# Patient Record
Sex: FEMALE | Race: WHITE | NOT HISPANIC OR LATINO | Employment: OTHER | URBAN - METROPOLITAN AREA
[De-identification: names, ages, dates, MRNs, and addresses within clinical notes are randomized per-mention and may not be internally consistent; named-entity substitution may affect disease eponyms.]

---

## 2022-07-18 ENCOUNTER — HOSPITAL ENCOUNTER (EMERGENCY)
Age: 87
Discharge: HOME | End: 2022-07-18
Attending: EMERGENCY MEDICINE

## 2022-07-18 VITALS
TEMPERATURE: 97.9 F | HEART RATE: 74 BPM | RESPIRATION RATE: 15 BRPM | DIASTOLIC BLOOD PRESSURE: 69 MMHG | WEIGHT: 206.57 LBS | SYSTOLIC BLOOD PRESSURE: 133 MMHG | OXYGEN SATURATION: 99 %

## 2022-07-18 DIAGNOSIS — H53.8 BLURRY VISION: Primary | ICD-10-CM

## 2022-07-18 PROCEDURE — 99283 EMERGENCY DEPT VISIT LOW MDM: CPT

## 2022-07-18 RX ORDER — CLONAZEPAM 0.5 MG/1
TABLET ORAL
COMMUNITY
Start: 2022-06-28

## 2022-07-18 RX ORDER — HYDROXYZINE PAMOATE 25 MG/1
CAPSULE ORAL
COMMUNITY
Start: 2022-04-29

## 2022-07-18 RX ORDER — CLONAZEPAM 0.25 MG/1
TABLET, ORALLY DISINTEGRATING ORAL
COMMUNITY
Start: 2022-07-11

## 2022-07-18 RX ORDER — APIXABAN 2.5 MG/1
TABLET, FILM COATED ORAL
COMMUNITY
Start: 2022-06-11

## 2022-07-18 RX ORDER — SERTRALINE HYDROCHLORIDE 25 MG/1
TABLET, FILM COATED ORAL
COMMUNITY
Start: 2022-04-13

## 2022-07-18 ASSESSMENT — ENCOUNTER SYMPTOMS
SEIZURES: 0
HEADACHES: 0
TREMORS: 0
DIZZINESS: 0
NUMBNESS: 0
SPEECH DIFFICULTY: 0
LIGHT-HEADEDNESS: 0
BRUISES/BLEEDS EASILY: 1
FACIAL ASYMMETRY: 0
WEAKNESS: 0

## 2022-07-18 ASSESSMENT — PAIN SCALES - GENERAL: PAINLEVEL_OUTOF10: 0

## 2022-07-19 LAB
RAINBOW EXTRA TUBES HOLD SPECIMEN: NORMAL

## 2022-10-05 ENCOUNTER — APPOINTMENT (OUTPATIENT)
Dept: CV DIAGNOSTICS | Facility: HOSPITAL | Age: 87
End: 2022-10-05
Attending: INTERNAL MEDICINE
Payer: MEDICARE

## 2022-10-05 ENCOUNTER — APPOINTMENT (OUTPATIENT)
Dept: GENERAL RADIOLOGY | Facility: HOSPITAL | Age: 87
End: 2022-10-05
Attending: HOSPITALIST
Payer: MEDICARE

## 2022-10-05 ENCOUNTER — APPOINTMENT (OUTPATIENT)
Dept: CT IMAGING | Facility: HOSPITAL | Age: 87
End: 2022-10-05
Attending: EMERGENCY MEDICINE
Payer: MEDICARE

## 2022-10-05 ENCOUNTER — HOSPITAL ENCOUNTER (OUTPATIENT)
Facility: HOSPITAL | Age: 87
Setting detail: OBSERVATION
Discharge: NURSING, REHAB OR LONG-TERM CARE | End: 2022-10-06
Attending: EMERGENCY MEDICINE | Admitting: STUDENT IN AN ORGANIZED HEALTH CARE EDUCATION/TRAINING PROGRAM
Payer: MEDICARE

## 2022-10-05 ENCOUNTER — HOSPITAL ENCOUNTER (INPATIENT)
Facility: HOSPITAL | Age: 87
LOS: 1 days | Discharge: NURSING, REHAB OR LONG-TERM CARE | End: 2022-10-06
Attending: EMERGENCY MEDICINE | Admitting: STUDENT IN AN ORGANIZED HEALTH CARE EDUCATION/TRAINING PROGRAM
Payer: MEDICARE

## 2022-10-05 DIAGNOSIS — I48.91 ATRIAL FIBRILLATION, UNSPECIFIED TYPE (HCC): ICD-10-CM

## 2022-10-05 DIAGNOSIS — I10 HYPERTENSION, UNSPECIFIED TYPE: ICD-10-CM

## 2022-10-05 DIAGNOSIS — K43.6 INCARCERATED VENTRAL HERNIA: Primary | ICD-10-CM

## 2022-10-05 DIAGNOSIS — F01.50 VASCULAR DEMENTIA, UNSPECIFIED DEMENTIA SEVERITY, UNSPECIFIED WHETHER BEHAVIORAL, PSYCHOTIC, OR MOOD DISTURBANCE OR ANXIETY (HCC): ICD-10-CM

## 2022-10-05 DIAGNOSIS — K43.9 VENTRAL HERNIA WITHOUT OBSTRUCTION OR GANGRENE: Primary | ICD-10-CM

## 2022-10-05 LAB
ALBUMIN SERPL-MCNC: 3.2 G/DL (ref 3.4–5)
ALBUMIN/GLOB SERPL: 0.9 {RATIO} (ref 1–2)
ALP LIVER SERPL-CCNC: 114 U/L
ALT SERPL-CCNC: 11 U/L
ANION GAP SERPL CALC-SCNC: 5 MMOL/L (ref 0–18)
AST SERPL-CCNC: 13 U/L (ref 15–37)
BASOPHILS # BLD AUTO: 0.03 X10(3) UL (ref 0–0.2)
BASOPHILS NFR BLD AUTO: 0.3 %
BILIRUB SERPL-MCNC: 0.5 MG/DL (ref 0.1–2)
BILIRUB UR QL STRIP.AUTO: NEGATIVE
BUN BLD-MCNC: 14 MG/DL (ref 7–18)
CALCIUM BLD-MCNC: 9.7 MG/DL (ref 8.5–10.1)
CHLORIDE SERPL-SCNC: 107 MMOL/L (ref 98–112)
CLARITY UR REFRACT.AUTO: CLEAR
CO2 SERPL-SCNC: 26 MMOL/L (ref 21–32)
CREAT BLD-MCNC: 0.62 MG/DL
EOSINOPHIL # BLD AUTO: 0.35 X10(3) UL (ref 0–0.7)
EOSINOPHIL NFR BLD AUTO: 3.7 %
ERYTHROCYTE [DISTWIDTH] IN BLOOD BY AUTOMATED COUNT: 14.1 %
GFR SERPLBLD BASED ON 1.73 SQ M-ARVRAT: 85 ML/MIN/1.73M2 (ref 60–?)
GLOBULIN PLAS-MCNC: 3.6 G/DL (ref 2.8–4.4)
GLUCOSE BLD-MCNC: 102 MG/DL (ref 70–99)
GLUCOSE UR STRIP.AUTO-MCNC: NEGATIVE MG/DL
HCT VFR BLD AUTO: 41.2 %
HGB BLD-MCNC: 13.3 G/DL
IMM GRANULOCYTES # BLD AUTO: 0.03 X10(3) UL (ref 0–1)
IMM GRANULOCYTES NFR BLD: 0.3 %
KETONES UR STRIP.AUTO-MCNC: NEGATIVE MG/DL
LEUKOCYTE ESTERASE UR QL STRIP.AUTO: NEGATIVE
LYMPHOCYTES # BLD AUTO: 1.28 X10(3) UL (ref 1–4)
LYMPHOCYTES NFR BLD AUTO: 13.5 %
MCH RBC QN AUTO: 29.7 PG (ref 26–34)
MCHC RBC AUTO-ENTMCNC: 32.3 G/DL (ref 31–37)
MCV RBC AUTO: 92 FL
MONOCYTES # BLD AUTO: 0.74 X10(3) UL (ref 0.1–1)
MONOCYTES NFR BLD AUTO: 7.8 %
NEUTROPHILS # BLD AUTO: 7.07 X10 (3) UL (ref 1.5–7.7)
NEUTROPHILS # BLD AUTO: 7.07 X10(3) UL (ref 1.5–7.7)
NEUTROPHILS NFR BLD AUTO: 74.4 %
NITRITE UR QL STRIP.AUTO: NEGATIVE
NT-PROBNP SERPL-MCNC: 1174 PG/ML (ref ?–450)
OSMOLALITY SERPL CALC.SUM OF ELEC: 287 MOSM/KG (ref 275–295)
PH UR STRIP.AUTO: 8 [PH] (ref 5–8)
PLATELET # BLD AUTO: 239 10(3)UL (ref 150–450)
POTASSIUM SERPL-SCNC: 4.1 MMOL/L (ref 3.5–5.1)
PROT SERPL-MCNC: 6.8 G/DL (ref 6.4–8.2)
PROT UR STRIP.AUTO-MCNC: NEGATIVE MG/DL
RBC # BLD AUTO: 4.48 X10(6)UL
RBC UR QL AUTO: NEGATIVE
SARS-COV-2 RNA RESP QL NAA+PROBE: NOT DETECTED
SODIUM SERPL-SCNC: 138 MMOL/L (ref 136–145)
SP GR UR STRIP.AUTO: 1 (ref 1–1.03)
UROBILINOGEN UR STRIP.AUTO-MCNC: <2 MG/DL
WBC # BLD AUTO: 9.5 X10(3) UL (ref 4–11)

## 2022-10-05 PROCEDURE — 99222 1ST HOSP IP/OBS MODERATE 55: CPT | Performed by: STUDENT IN AN ORGANIZED HEALTH CARE EDUCATION/TRAINING PROGRAM

## 2022-10-05 PROCEDURE — 99223 1ST HOSP IP/OBS HIGH 75: CPT | Performed by: HOSPITALIST

## 2022-10-05 PROCEDURE — 71045 X-RAY EXAM CHEST 1 VIEW: CPT | Performed by: HOSPITALIST

## 2022-10-05 PROCEDURE — 93306 TTE W/DOPPLER COMPLETE: CPT | Performed by: INTERNAL MEDICINE

## 2022-10-05 PROCEDURE — 74177 CT ABD & PELVIS W/CONTRAST: CPT | Performed by: EMERGENCY MEDICINE

## 2022-10-05 RX ORDER — AMLODIPINE BESYLATE 5 MG/1
10 TABLET ORAL DAILY
Status: DISCONTINUED | OUTPATIENT
Start: 2022-10-06 | End: 2022-10-06

## 2022-10-05 RX ORDER — MORPHINE SULFATE 4 MG/ML
1 INJECTION, SOLUTION INTRAMUSCULAR; INTRAVENOUS EVERY 2 HOUR PRN
Status: DISCONTINUED | OUTPATIENT
Start: 2022-10-05 | End: 2022-10-06

## 2022-10-05 RX ORDER — ENOXAPARIN SODIUM 100 MG/ML
40 INJECTION SUBCUTANEOUS DAILY
Status: DISCONTINUED | OUTPATIENT
Start: 2022-10-05 | End: 2022-10-06

## 2022-10-05 RX ORDER — MORPHINE SULFATE 4 MG/ML
2 INJECTION, SOLUTION INTRAMUSCULAR; INTRAVENOUS EVERY 2 HOUR PRN
Status: DISCONTINUED | OUTPATIENT
Start: 2022-10-05 | End: 2022-10-06

## 2022-10-05 RX ORDER — METOCLOPRAMIDE HYDROCHLORIDE 5 MG/ML
10 INJECTION INTRAMUSCULAR; INTRAVENOUS EVERY 8 HOURS PRN
Status: DISCONTINUED | OUTPATIENT
Start: 2022-10-05 | End: 2022-10-06

## 2022-10-05 RX ORDER — NYSTATIN 10B UNIT
POWDER (EA) MISCELLANEOUS 3 TIMES DAILY
COMMUNITY

## 2022-10-05 RX ORDER — AMOXICILLIN 500 MG
1200 CAPSULE ORAL DAILY
COMMUNITY

## 2022-10-05 RX ORDER — SODIUM CHLORIDE 9 MG/ML
1000 INJECTION, SOLUTION INTRAVENOUS ONCE
Status: COMPLETED | OUTPATIENT
Start: 2022-10-05 | End: 2022-10-05

## 2022-10-05 RX ORDER — CLONAZEPAM 0.25 MG/1
0.25 TABLET, ORALLY DISINTEGRATING ORAL DAILY
Status: DISCONTINUED | OUTPATIENT
Start: 2022-10-05 | End: 2022-10-06

## 2022-10-05 RX ORDER — ONDANSETRON 2 MG/ML
4 INJECTION INTRAMUSCULAR; INTRAVENOUS ONCE
Status: COMPLETED | OUTPATIENT
Start: 2022-10-05 | End: 2022-10-05

## 2022-10-05 RX ORDER — ACETAMINOPHEN AND CODEINE PHOSPHATE 300; 30 MG/1; MG/1
1 TABLET ORAL EVERY 8 HOURS PRN
COMMUNITY
Start: 2022-10-01 | End: 2022-10-06

## 2022-10-05 RX ORDER — MELATONIN
6 NIGHTLY PRN
COMMUNITY

## 2022-10-05 RX ORDER — ACETAMINOPHEN 325 MG/1
650 TABLET ORAL EVERY 6 HOURS PRN
Status: DISCONTINUED | OUTPATIENT
Start: 2022-10-05 | End: 2022-10-06

## 2022-10-05 RX ORDER — NITROGLYCERIN 0.4 MG/1
0.4 TABLET SUBLINGUAL EVERY 5 MIN PRN
COMMUNITY

## 2022-10-05 RX ORDER — ACETAMINOPHEN 325 MG/1
650 TABLET ORAL EVERY 6 HOURS PRN
COMMUNITY

## 2022-10-05 RX ORDER — ATORVASTATIN CALCIUM 10 MG/1
10 TABLET, FILM COATED ORAL NIGHTLY
Status: DISCONTINUED | OUTPATIENT
Start: 2022-10-05 | End: 2022-10-06

## 2022-10-05 RX ORDER — PANTOPRAZOLE SODIUM 40 MG/1
40 TABLET, DELAYED RELEASE ORAL
Status: DISCONTINUED | OUTPATIENT
Start: 2022-10-06 | End: 2022-10-06

## 2022-10-05 RX ORDER — AMLODIPINE BESYLATE 10 MG/1
10 TABLET ORAL DAILY
COMMUNITY

## 2022-10-05 RX ORDER — DESMOPRESSIN ACETATE 0.1 MG/1
0.2 TABLET ORAL DAILY
Status: DISCONTINUED | OUTPATIENT
Start: 2022-10-05 | End: 2022-10-06

## 2022-10-05 RX ORDER — FUROSEMIDE 10 MG/ML
20 INJECTION INTRAMUSCULAR; INTRAVENOUS ONCE
Status: COMPLETED | OUTPATIENT
Start: 2022-10-05 | End: 2022-10-05

## 2022-10-05 RX ORDER — ACETAMINOPHEN 500 MG
500 TABLET ORAL 2 TIMES DAILY
COMMUNITY
End: 2022-10-06

## 2022-10-05 RX ORDER — MELATONIN
6 NIGHTLY PRN
Status: DISCONTINUED | OUTPATIENT
Start: 2022-10-05 | End: 2022-10-06

## 2022-10-05 RX ORDER — MORPHINE SULFATE 4 MG/ML
4 INJECTION, SOLUTION INTRAMUSCULAR; INTRAVENOUS EVERY 2 HOUR PRN
Status: DISCONTINUED | OUTPATIENT
Start: 2022-10-05 | End: 2022-10-06

## 2022-10-05 RX ORDER — ONDANSETRON 2 MG/ML
4 INJECTION INTRAMUSCULAR; INTRAVENOUS EVERY 6 HOURS PRN
Status: DISCONTINUED | OUTPATIENT
Start: 2022-10-05 | End: 2022-10-06

## 2022-10-05 RX ORDER — SODIUM CHLORIDE 9 MG/ML
INJECTION, SOLUTION INTRAVENOUS CONTINUOUS
Status: DISCONTINUED | OUTPATIENT
Start: 2022-10-05 | End: 2022-10-05

## 2022-10-05 RX ORDER — ATORVASTATIN CALCIUM 10 MG/1
10 TABLET, FILM COATED ORAL NIGHTLY
COMMUNITY

## 2022-10-05 RX ORDER — HYDROMORPHONE HYDROCHLORIDE 1 MG/ML
0.5 INJECTION, SOLUTION INTRAMUSCULAR; INTRAVENOUS; SUBCUTANEOUS EVERY 30 MIN PRN
Status: DISCONTINUED | OUTPATIENT
Start: 2022-10-05 | End: 2022-10-05

## 2022-10-05 RX ORDER — ONDANSETRON 2 MG/ML
4 INJECTION INTRAMUSCULAR; INTRAVENOUS EVERY 4 HOURS PRN
Status: DISCONTINUED | OUTPATIENT
Start: 2022-10-05 | End: 2022-10-05

## 2022-10-05 RX ORDER — PANTOPRAZOLE SODIUM 40 MG/1
40 TABLET, DELAYED RELEASE ORAL
COMMUNITY

## 2022-10-05 RX ORDER — CLONAZEPAM 0.25 MG/1
0.25 TABLET, ORALLY DISINTEGRATING ORAL DAILY
Status: ON HOLD | COMMUNITY
Start: 2022-09-30 | End: 2022-10-06

## 2022-10-05 RX ORDER — DESMOPRESSIN ACETATE 0.2 MG/1
0.2 TABLET ORAL DAILY
COMMUNITY

## 2022-10-05 NOTE — ED QUICK NOTES
Pt awake and alert. No pain. Awaiting bed assignment.  Aware of her admission and verbalizing understanding

## 2022-10-05 NOTE — ED INITIAL ASSESSMENT (HPI)
A/O x 3-4. Patient from [address] [place] [address]. Patient presents with abdominal pain and \"abdominal mass\". Patient reports that abdominal pain started 2-3 hours ago.   Denies any nausea, vomiting, diarrhea

## 2022-10-05 NOTE — PROGRESS NOTES
Full note to follow:    Is a 80-year-old female with history of A. fib on Eliquis who presents with abdominal pain. She reports having bowel movements most recently yesterday. She denies any nausea or vomiting and has no obstructive symptoms. She reports that this hernia that she is has since 2008 since her gallbladder surgery. She reports decreased pain at this time. Abdomen is soft, nondistended, nontender, no rebound tenderness or guarding no peritoneal signs, there is a hernia defect in the periumbilical area with herniated: That is chronically incarcerated and nontender patient    She has no leukocytosis reviewed her CT scan there is an evidence of an incarcerated ventral hernia with part of the transverse colon with some fluid around it      Plan for now is to hold her Eliquis and get cardiac clearance in case this becomes an operative issue. However due to the chronicity of this hernia is unlikely to be strangulated and the changes in the hernia sac are likely due to ER attempts of reduction. She has minimal pain at this time with no tenderness at the hernia and has no other indices of an acute abdomen.         Lukasz Hurd MD  [facility] general surgery

## 2022-10-05 NOTE — PLAN OF CARE
Assumed care of patient at ~1200 from ED. Alert and oriented. Forgetful. Atrial fib on tele. Heart rate controlled. Lungs with crackles at bases. SPO2 high 80s on room air. O2 weaned from 3L to 1L. Breathing comfortably. Echo at bedside. Plan of care updated with son over phone and at bedside. POLST signed by son and RN. Awaiting signature from doc. Patient turns well in bed. Skin with redness to uday heels and sacrum. All blanchable. Cardiology consulted and notified. Orders rec'd. Neuro Psych consulted, face sheet faxed to Dr. Mirian Talbert office. Confirmation rec'd. Will cn to monitor .   Problem: Patient/Family Goals  Goal: Patient/Family Long Term Goal  Description: Patient's Long Term Goal: No further admission    Interventions:  - compliance with dietary modifications  -compliance with lifestyle modifications  -appropriate follow up with MD  - See additional Care Plan goals for specific interventions  Outcome: Progressing  Goal: Patient/Family Short Term Goal  Description: Patient's Short Term Goal: discharge    Interventions:   - compliance with med regimen  -participation in plan of care  -diagnostic testing  - See additional Care Plan goals for specific interventions  Outcome: Progressing

## 2022-10-06 VITALS
DIASTOLIC BLOOD PRESSURE: 60 MMHG | WEIGHT: 212.75 LBS | OXYGEN SATURATION: 99 % | HEIGHT: 67 IN | BODY MASS INDEX: 33.39 KG/M2 | RESPIRATION RATE: 16 BRPM | SYSTOLIC BLOOD PRESSURE: 114 MMHG | HEART RATE: 60 BPM | TEMPERATURE: 98 F

## 2022-10-06 LAB
ALBUMIN SERPL-MCNC: 2.7 G/DL (ref 3.4–5)
ALBUMIN/GLOB SERPL: 0.8 {RATIO} (ref 1–2)
ALP LIVER SERPL-CCNC: 109 U/L
ALT SERPL-CCNC: 8 U/L
ANION GAP SERPL CALC-SCNC: 6 MMOL/L (ref 0–18)
AST SERPL-CCNC: 12 U/L (ref 15–37)
BASOPHILS # BLD AUTO: 0.06 X10(3) UL (ref 0–0.2)
BASOPHILS NFR BLD AUTO: 0.9 %
BILIRUB SERPL-MCNC: 0.5 MG/DL (ref 0.1–2)
BUN BLD-MCNC: 11 MG/DL (ref 7–18)
CALCIUM BLD-MCNC: 9.1 MG/DL (ref 8.5–10.1)
CHLORIDE SERPL-SCNC: 108 MMOL/L (ref 98–112)
CO2 SERPL-SCNC: 23 MMOL/L (ref 21–32)
CREAT BLD-MCNC: 0.33 MG/DL
EOSINOPHIL # BLD AUTO: 0.36 X10(3) UL (ref 0–0.7)
EOSINOPHIL NFR BLD AUTO: 5.1 %
ERYTHROCYTE [DISTWIDTH] IN BLOOD BY AUTOMATED COUNT: 14.4 %
GFR SERPLBLD BASED ON 1.73 SQ M-ARVRAT: 99 ML/MIN/1.73M2 (ref 60–?)
GLOBULIN PLAS-MCNC: 3.4 G/DL (ref 2.8–4.4)
GLUCOSE BLD-MCNC: 90 MG/DL (ref 70–99)
HCT VFR BLD AUTO: 41.4 %
HGB BLD-MCNC: 12.3 G/DL
IMM GRANULOCYTES # BLD AUTO: 0.03 X10(3) UL (ref 0–1)
IMM GRANULOCYTES NFR BLD: 0.4 %
LYMPHOCYTES # BLD AUTO: 1.11 X10(3) UL (ref 1–4)
LYMPHOCYTES NFR BLD AUTO: 15.8 %
MCH RBC QN AUTO: 29.6 PG (ref 26–34)
MCHC RBC AUTO-ENTMCNC: 29.7 G/DL (ref 31–37)
MCV RBC AUTO: 99.8 FL
MONOCYTES # BLD AUTO: 0.71 X10(3) UL (ref 0.1–1)
MONOCYTES NFR BLD AUTO: 10.1 %
NEUTROPHILS # BLD AUTO: 4.77 X10 (3) UL (ref 1.5–7.7)
NEUTROPHILS # BLD AUTO: 4.77 X10(3) UL (ref 1.5–7.7)
NEUTROPHILS NFR BLD AUTO: 67.7 %
OSMOLALITY SERPL CALC.SUM OF ELEC: 283 MOSM/KG (ref 275–295)
PLATELET # BLD AUTO: 213 10(3)UL (ref 150–450)
POTASSIUM SERPL-SCNC: 4.5 MMOL/L (ref 3.5–5.1)
PROT SERPL-MCNC: 6.1 G/DL (ref 6.4–8.2)
RBC # BLD AUTO: 4.15 X10(6)UL
SODIUM SERPL-SCNC: 137 MMOL/L (ref 136–145)
WBC # BLD AUTO: 7 X10(3) UL (ref 4–11)

## 2022-10-06 PROCEDURE — 99232 SBSQ HOSP IP/OBS MODERATE 35: CPT | Performed by: STUDENT IN AN ORGANIZED HEALTH CARE EDUCATION/TRAINING PROGRAM

## 2022-10-06 PROCEDURE — 99239 HOSP IP/OBS DSCHRG MGMT >30: CPT | Performed by: HOSPITALIST

## 2022-10-06 RX ORDER — CLONAZEPAM 0.25 MG/1
0.25 TABLET, ORALLY DISINTEGRATING ORAL DAILY
Qty: 5 TABLET | Refills: 0 | Status: SHIPPED | OUTPATIENT
Start: 2022-10-06

## 2022-10-06 RX ORDER — POLYETHYLENE GLYCOL 3350 17 G/17G
17 POWDER, FOR SOLUTION ORAL DAILY PRN
Status: DISCONTINUED | OUTPATIENT
Start: 2022-10-06 | End: 2022-10-06

## 2022-10-06 RX ORDER — BISACODYL 10 MG
10 SUPPOSITORY, RECTAL RECTAL
Status: DISCONTINUED | OUTPATIENT
Start: 2022-10-06 | End: 2022-10-06

## 2022-10-06 RX ORDER — SODIUM PHOSPHATE, DIBASIC AND SODIUM PHOSPHATE, MONOBASIC 7; 19 G/133ML; G/133ML
1 ENEMA RECTAL ONCE AS NEEDED
Status: DISCONTINUED | OUTPATIENT
Start: 2022-10-06 | End: 2022-10-06

## 2022-10-06 RX ORDER — HALOPERIDOL 5 MG/ML
1 INJECTION INTRAMUSCULAR ONCE
Status: DISCONTINUED | OUTPATIENT
Start: 2022-10-06 | End: 2022-10-06

## 2022-10-06 RX ORDER — DOCUSATE SODIUM 100 MG/1
100 CAPSULE, LIQUID FILLED ORAL 2 TIMES DAILY
Status: DISCONTINUED | OUTPATIENT
Start: 2022-10-06 | End: 2022-10-06

## 2022-10-06 NOTE — PLAN OF CARE
NURSING DISCHARGE NOTE    Discharged Nursing home via Ambulance. Accompanied by Support staff  Belongings Taken by patient/family. IV and tele removed. Son Dorie Omalley updated on POC, states agrees to not have surgery at this time. Pt tolerated diet. No nausea, vomiting or pain.

## 2022-10-06 NOTE — CM/SW NOTE
10/06/22 1500   Discharge disposition   Expected discharge disposition [id number] Provider [facility]'   Discharge transportation [facility]     CM notified of pt's medical clearance for discharge, as she is declining surgical intervention. Per RN, pt denies symptoms from hernia and is wanting to go home. Grzegorz Hutchins ([phone]) at [facility]. Pat's confirmed ability for pt to return this evening. [facility] arranged for 1600 ([id number]). RN updated and requested to send updated, validated POLST with pt at discharge. PCS updated. Voicemail left for pt's son, George Hunter ([phone]), to provide update.     RN to call report: [phone] and ask for Amanda Thompson, BSN, RN-BC    [id number]

## 2022-10-06 NOTE — PROGRESS NOTES
10/06/22 1108   Clinical Encounter Type   Visited With Patient not available  (Patient was sleeping.  will return later today)       Patient was sleeping.  will return later today.       [facility] [place]   Ext [phone]

## 2022-10-06 NOTE — PROGRESS NOTES
10/06/22 1124   Clinical Encounter Type   Visited With Health care provider;Patient   Routine Visit   (Responded to consult)   Referral To   ([facility]  for [facility] and prayers.)   Orthodox Encounters   Orthodox Needs Prayer   Taxonomy   Intended Effects Aligning care plan with patient's values   Methods Offer spiritual/Yazidism support   Interventions Acknowledge current situation; Active listening;Silent prayer; Share words of hope and inspiration   Spiritual Care support can be requested via an Epic consult.

## 2022-10-06 NOTE — CM/SW NOTE
CM consulted for POLST completion. Signed/witnessed POLST noted on pt's hard chart with DNAR/Select wishes selected. Physician signature needed for validation. Notified Dr. Lindsay Warren and RN. Copy of validated POLST to be sent with patient at time of discharge and to be sent to medical records to be scanned into EMR. [phone]: RN confirmed physician signed POLST to validate. RN to send copy with pt at discharge. CM/SW will remain available for discharge planning needs.     Raquel Sherwood, ORALIAN, RN-BC    [id number]

## 2022-10-06 NOTE — PLAN OF CARE
Assumed patient care at [address]  Patient oriented x 3, forgetful   Afib controlled on tele   On o2 1lnc, vitals stable   Denies any pain   States tolerating diet   Safety precautions in place       Problem: Patient/Family Goals  Goal: Patient/Family Long Term Goal  Description: Patient's Long Term Goal: No further admission    Interventions:  - compliance with dietary modifications  -compliance with lifestyle modifications  -appropriate follow up with MD  - See additional Care Plan goals for specific interventions  Outcome: Progressing  Goal: Patient/Family Short Term Goal  Description: Patient's Short Term Goal: discharge    Interventions:   - compliance with med regimen  -participation in plan of care  -diagnostic testing  - See additional Care Plan goals for specific interventions  Outcome: Progressing

## 2022-10-06 NOTE — PHYSICAL THERAPY NOTE
PT order received, chart reviewed - plan for elective ventral hernia repair today. Will evaluate post op as appropriate.

## 2022-10-12 NOTE — PAYOR COMM NOTE
--------------  DISCHARGE REVIEW    Payor: [employer] MEDICARE ADV PPO  Subscriber #:  [id number]  Authorization Number: [id number]    Admit date: N/A  Admit time:  N/A  Discharge Date: 10/6/2022  4:05 PM       STATUS CHANGED TO OBSERVATION

## 2023-07-08 ENCOUNTER — HOSPITAL ENCOUNTER (EMERGENCY)
Facility: HOSPITAL | Age: 88
Discharge: HOME | End: 2023-07-08
Attending: EMERGENCY MEDICINE
Payer: MEDICARE

## 2023-07-08 VITALS
HEIGHT: 67 IN | HEART RATE: 80 BPM | DIASTOLIC BLOOD PRESSURE: 83 MMHG | SYSTOLIC BLOOD PRESSURE: 146 MMHG | OXYGEN SATURATION: 94 % | RESPIRATION RATE: 17 BRPM | BODY MASS INDEX: 32.96 KG/M2 | WEIGHT: 210 LBS | TEMPERATURE: 98 F
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
## 2023-07-08 DIAGNOSIS — J34.89 LESION OF NOSE: ICD-10-CM

## 2023-07-08 LAB
BASOPHILS # BLD AUTO: 0.04 X10(3) UL (ref 0–0.2)
BASOPHILS NFR BLD AUTO: 0.4 %
EOSINOPHIL # BLD AUTO: 0.35 X10(3) UL (ref 0–0.7)
EOSINOPHIL NFR BLD AUTO: 3.5 %
ERYTHROCYTE [DISTWIDTH] IN BLOOD BY AUTOMATED COUNT: 14.6 %
HCT VFR BLD AUTO: 43.6 %
HGB BLD-MCNC: 13.7 G/DL
IMM GRANULOCYTES # BLD AUTO: 0.05 X10(3) UL (ref 0–1)
IMM GRANULOCYTES NFR BLD: 0.5 %
LYMPHOCYTES # BLD AUTO: 1.64 X10(3) UL (ref 1–4)
LYMPHOCYTES NFR BLD AUTO: 16.4 %
MCH RBC QN AUTO: 27.8 PG (ref 26–34)
MCHC RBC AUTO-ENTMCNC: 31.4 G/DL (ref 31–37)
MCV RBC AUTO: 88.4 FL
MONOCYTES # BLD AUTO: 0.94 X10(3) UL (ref 0.1–1)
MONOCYTES NFR BLD AUTO: 9.4 %
NEUTROPHILS # BLD AUTO: 6.99 X10 (3) UL (ref 1.5–7.7)
NEUTROPHILS # BLD AUTO: 6.99 X10(3) UL (ref 1.5–7.7)
NEUTROPHILS NFR BLD AUTO: 69.8 %
PLATELET # BLD AUTO: 230 10(3)UL (ref 150–450)
RBC # BLD AUTO: 4.93 X10(6)UL
WBC # BLD AUTO: 10 X10(3) UL (ref 4–11)

## 2023-07-08 PROCEDURE — 80053 COMPREHEN METABOLIC PANEL: CPT | Performed by: EMERGENCY MEDICINE

## 2023-07-08 PROCEDURE — 85025 COMPLETE CBC W/AUTO DIFF WBC: CPT | Performed by: EMERGENCY MEDICINE

## 2023-07-08 PROCEDURE — 99284 EMERGENCY DEPT VISIT MOD MDM: CPT

## 2023-07-08 PROCEDURE — 85025 COMPLETE CBC W/AUTO DIFF WBC: CPT

## 2023-07-08 PROCEDURE — 99283 EMERGENCY DEPT VISIT LOW MDM: CPT

## 2023-07-08 PROCEDURE — 36415 COLL VENOUS BLD VENIPUNCTURE: CPT

## 2023-07-09 NOTE — DISCHARGE INSTRUCTIONS
Take 1000 mg acetaminophen (2 Tylenol tablets) every 6 hours as needed for pain     See a dermatologist about the lesion on your nose to make sure it is not skin cancer

## 2025-04-09 ENCOUNTER — OFFICE VISIT (OUTPATIENT)
Dept: SURGERY | Facility: CLINIC | Age: OVER 89
End: 2025-04-09
Payer: COMMERCIAL

## 2025-04-09 DIAGNOSIS — C44.310 BASAL CELL CARCINOMA, FACE: Primary | ICD-10-CM

## 2025-04-09 NOTE — CONSULTS
New Patient Consultation    Chief Complaint: basal cell carcinoma left inferior medial forehead and nasal tip     History of Present Illness:   Bernadette Shahid is a 89 year old female referred by [facility] for evaluation of a recently diagnosed basal cell carcinoma of the left inferior medial forehead and nasal tip. These were diagnosed via shave biopsy on 1/21/2025. She went to a dermatologist for excision with a Mohs surgeon but as she requires a kinza lift, this was unable to be done at their office. The son reports the nasal lesion started growing in December 2024. She is here today to discuss options for excision and reconstruction.     Pathology from 1/21/2025:   A. Left inferior medial forehead  Metatypical basal cell carcinoma  B. Nasal dorsum  Metatypical basal cell carcinoma    Past Medical History:      Hem/Onc  Basal cell carcinoma, face     Other  Incarcerated ventral hernia  Vascular dementia, unspecified dementia severity, unspecified whether behavioral, psychotic, or mood disturbance or anxiety (HCC)  Hypertension, unspecified type         Past Surgical History:  Past Surgical History[1]      Medications:    clonazePAM 0.25 MG Oral Tablet Dispersible  amLODIPine 10 MG Oral Tab  atorvastatin 10 MG Oral Tab  desmopressin 0.2 MG Oral Tab  apixaban 5 MG Oral Tab  Omega-3 Fatty Acids (FISH OIL) 1200 MG Oral Cap  melatonin 3 MG Oral Tab  pantoprazole 40 MG Oral Tab EC  sertraline 50 MG Oral Tab  acetaminophen 325 MG Oral Tab  diclofenac 1 % External Gel  nitroglycerin 0.4 MG Sublingual SL Tab  Nystatin Does not apply Powder  Turmeric 450 MG Oral Cap       Allergies:    Allergies[2]      Family History:   Family History[3]      Social History:  History   Alcohol use Not on file       History   Smoking Status    Never   Smokeless Tobacco    Never       History   Drug Use Not on file       Review of Systems:    A 13 point review of systems was performed on the intake sheet.  The patient reports    General:   The patient denies, fever, chills, night sweats, fatigue, generalized weakness, change in appetite, weight loss, or weight gain.  Endocrine:   There is no history of poor/slow wound healing, weight loss/gain, fertility or hormone problems, cold intolerance, heat intolerance, excessive thirst, or thyroid disease.   Allergic/Immunologic:  There is no history of hives, hay fever, angioedema or anaphylaxis.  HEENT:    The patient denies ear pain, ear drainage, hearing loss, change in vision, double vision, cataracts, glaucoma, nasal congestion, nosebleed, hoarseness, sore throat, or swollen glands  Respiratory:   The patient denies shortness of breath, cough, bloody cough, phlegm, asthma, or wheezing  Cardiovascular:  The patient denies chest pain/pressure, palpitations, irregular heartbeat, high blood pressure, stroke, or leg swelling  Breasts:  Patient denies breast masses, pain, change in the breast skin, skin dimpling, nipple discharge, or rash  Gastrointestinal:   There is no history of difficulty or pain with swallowing, reflux symptoms, nausea, vomiting, dark/ bloody stools, diarrhea, constipation,  change in bowel habits, or abdominal pain.   Genitourinary:  The patient denies frequent urination, needing to get up at night to urinate, urinary hesitancy or retaining urine, painful urination, urinary incontinence, decreased urine stream, blood in the urine, or vaginal/penile discharge.  Skin:   The patient denies rash, itching, +skin lesions, +dry skin, +change in skin color or change in moles, sunburns, or sunburns with blistering.   Hematologic/Lymphatic:  The patient denies easily bruising or bleeding, persistent swollen glands or lymph nodes, bleeding disorders, blood clots, or pulmonary embolism.   Gynecologic:  The patient denies irregular menses, pelvic pain, pain with intercourse, painful menses, or pregnancy  Musculoskeletal:  The patient denies muscle aches/pain, joint pain, stiff joints,  neck pain, back pain or bone pain.  Neurologic:  There is no history of migraines or severe headaches, seizure/epilepsy, speech problems, coordination problems, trembling/tremors, fainting/black outs, dizziness, memory problems, loss of sensation/numbness, problems walking, weakness, tingling or burning in hands/feet.   Psychiatric:  There is no history of abusive relationship, bipolar disorder, sleep disturbance, anxiety, depression or feeling of despair.    Physical Exam:    There were no vitals taken for this visit.    Constitutional: The patient is an alert, oriented and well-developed.     Neurologic: Speech patterns and movements are normal.     Psychiatric: Affect is appropriate.    Eyes: Conjunctiva are clear, non-icteric. PERRL    ENT: no obvious abnormality, no ear drainage, mucous membranes moist and pink    Integument/Skin: crusted basal cell carcinoma nasal tip approximately 1 cm diameter, not fixed and mobile basal cell carcinoma 1.5 cm wide by 2.5 cm long left inferior medial forehead    Respiratory: Normal respiratory effort.     Cardiovascular: no cyanosis, no edema    Musculoskeletal: Extremities unremarkable, without edema, tenderness or deformities    Impression:   Bernadette Shahid  is a 89 year old with basal cell carcinoma of the left inferior medial forehead and nasal tip.     Discussion and Plan:  The patient was counseled on the different treatment options.     Patient is in a wheelchair and requires a kinza lift. She was unable to proceed with Mohs excision given her limited mobility.     We discussed the option for excision in the operating room with intraoperative frozen sections.     Given her age and medical comorbidities, we discussed proceeding with the procedure under local anesthesia. We will obtain clearance from her primary care provider and her cardiologist with perioperative Eliquis instructions.    We will schedule her for excision of basal cell carcinoma nasal tip and possible  excision of left forehead basal cell carcinoma with intraoperative frozen sections, reconstruction with skin graft, possible integra, possible local tissue rearrangement.    We will start with excision of the nasal basal cell carcinoma. We discussed the possibility that if the excision becomes too extensive, we discussed that there may still be some residual basal cell carcinoma of the nose and the forehead may not be able to be addressed at the first surgery due to limitations with local anesthesia.     We discussed the procedure and expected post-operative course in detail. Representative photos were reviewed with the patient and family.     The different treatment options were discussed with the patient.  The procedures and the postoperative care was discussed in detail.  Potential risks complications benefits and alternatives were discussed.  Risks and complications including but not limited to infection, bleeding, scarring, damage to surrounding structures, such as nerves, blood vessels, muscles,  tendons, risk of hematoma, seroma, foreign body reaction, allergic reaction, wound dehiscences, delayed wound healing, graft failure, flap failure, need for further surgery.    Patient understands and wishes to proceed with the procedure, questions were answered.    45 minutes were spent with the patient, from which 35 minutes were spent counseling the patient and coordinating care.         [1] History reviewed. No pertinent surgical history.  [2] No Known Allergies  [3] History reviewed. No pertinent family history.

## 2025-04-09 NOTE — PATIENT INSTRUCTIONS
Surgeon:              Dr. Odilia Holloway, PhD                                          Tel:         [phone]                                  Fax:        [phone]      Surgery/Procedure: Excision of basal cell carcinoma nasal tip and possible excision of left forehead basal cell carcinoma with intraoperative frozen sections, reconstruction with skin graft, possible integra, possible local tissue rearrangement  3 hours, local anesthesia, outpatient, special equipment: kinza lift  AND  Nasal tip and left forehead reconstruction with skin graft  2 hours, local anesthesia, outpatient, 3 weeks after first procedure, special equipment: kinza lift    Dx Code: [C44.310]     Hospital:  [facility]: [address] [place] [address]           [phone]  [facility]: [address] [place] [address]               [phone]    1. Someone will need to drive you to and from the hospital if your procedure is outpatient.    2.Do not drink alcohol or smoke 24 hours prior to your procedure.    3. Bring a picture ID and your insurance card.    4. You will be contacted by the hospital the day before to confirm the procedure time and location.     5. Do not take any herbal supplements or blood thinners at least one week before your procedure/surgery. This includes NSAID's (aspirin, baby aspirin, Motrin, Ibuprofen, Aleve, Advil, Naproxen, etc), fish oil, vitamin E, turmeric, CoQ10, or green tea supplements, etc. *TYLENOL or acetaminophen is ok to take*    6. PRE-OPERATIVE TESTING: History and physical with medical clearance is REQUIRED within 30 days of the surgery date and is mandatory per Dr. Holloway. *If this is not done, your surgery will be postponed*  Medical clearance with Dr. Venegas and Dr. Kevin Monet (cardiologist) with perioperative Eliquis instructions and ok to proceed with surgery under local anesthesia    7. Please inform us if you start or change any medications at least one week  before surgery (ex: blood thinners, weight loss medications, diabetic medications, herbal supplements, etc)    8. Does patient have diagnosis of sleep apnea?    [   ] Yes     [ X  ]  No    Consent obtained   Photos taken on 4/9/2025

## 2025-04-11 ENCOUNTER — TELEPHONE (OUTPATIENT)
Facility: CLINIC | Age: OVER 89
End: 2025-04-11

## 2025-04-11 ENCOUNTER — TELEPHONE (OUTPATIENT)
Dept: SURGERY | Facility: CLINIC | Age: OVER 89
End: 2025-04-11

## 2025-04-11 NOTE — TELEPHONE ENCOUNTER
DEBBIE for patient asking them to please give me a call back regarding scheduling their surgery with Dr. Holloway

## 2025-04-11 NOTE — TELEPHONE ENCOUNTER
Spoke with staff at [facility] in regards to upcoming surgery with Dr. Holloway. Requested clearance documents be faxed over.     Called patient's son, Haile, and LVM letting him know we are ok to proceed with scheduling the patient's surgery.

## 2025-04-14 ENCOUNTER — TELEPHONE (OUTPATIENT)
Facility: CLINIC | Age: OVER 89
End: 2025-04-14

## 2025-04-14 NOTE — TELEPHONE ENCOUNTER
Patients son/POA called and LVM stating that he is not ready to schedule surgery yet for his mother.  Haile stated that he wants to speak to the provider at the facility that his mother is at as well as discuss this surgery with his mother prior to scheduling.  Patients son stated that he will reach out to our office when he is ready to get his mother scheduled.

## 2025-05-05 ENCOUNTER — TELEPHONE (OUTPATIENT)
Dept: SURGERY | Facility: CLINIC | Age: OVER 89
End: 2025-05-05

## 2025-05-05 DIAGNOSIS — C44.310 BASAL CELL CARCINOMA, FACE: Primary | ICD-10-CM

## 2025-05-05 NOTE — TELEPHONE ENCOUNTER
Calling pt in regards to scheduling surgery.  Informed pt that I have 5/13/25 available at [facility] with Dr. Holloway.  Pt verbalized understanding and in agreement with date and location.  All questions answered.   Encouraged pt to call or [employer] message office with any other questions or concerns.

## 2025-05-06 RX ORDER — POTASSIUM CHLORIDE 1.5 G/1.58G
20 POWDER, FOR SOLUTION ORAL 2 TIMES DAILY
COMMUNITY

## 2025-05-06 RX ORDER — BISACODYL 10 MG
10 SUPPOSITORY, RECTAL RECTAL DAILY
COMMUNITY

## 2025-05-06 RX ORDER — ALBUTEROL SULFATE 0.83 MG/ML
SOLUTION RESPIRATORY (INHALATION) EVERY 6 HOURS PRN
COMMUNITY

## 2025-05-06 RX ORDER — GARLIC EXTRACT 500 MG
1 CAPSULE ORAL DAILY
COMMUNITY

## 2025-05-06 RX ORDER — HYDROCHLOROTHIAZIDE 12.5 MG/1
12.5 TABLET ORAL DAILY
COMMUNITY

## 2025-05-06 RX ORDER — FUROSEMIDE 20 MG/1
20 TABLET ORAL 2 TIMES DAILY
COMMUNITY

## 2025-05-06 RX ORDER — MEMANTINE HYDROCHLORIDE 10 MG/1
10 TABLET ORAL 2 TIMES DAILY
COMMUNITY

## 2025-05-12 ENCOUNTER — TELEPHONE (OUTPATIENT)
Dept: SURGERY | Facility: CLINIC | Age: OVER 89
End: 2025-05-12

## 2025-05-12 NOTE — TELEPHONE ENCOUNTER
Received call from Kem with [facility] stating the patient's Vitamin E and turmeric were given to her this morning. Confirmed patient's Eliquis is being held in preparation for surgery tomorrow.     Discussed with Sylvia from pre-admission testing and with Dr. Holloway. The patient is ok to proceed with surgery as planned tomorrow.    Called Kem back to inform her ok to proceed, asked for all vitamins and supplements to be held tonight and tomorrow and continue holding Eliquis. She verbalized understanding.

## 2025-05-13 ENCOUNTER — HOSPITAL ENCOUNTER (OUTPATIENT)
Facility: HOSPITAL | Age: OVER 89
Setting detail: HOSPITAL OUTPATIENT SURGERY
Discharge: HOME | End: 2025-05-13
Attending: SURGERY | Admitting: SURGERY
Payer: MEDICARE

## 2025-05-13 VITALS
RESPIRATION RATE: 14 BRPM | TEMPERATURE: 97 F | OXYGEN SATURATION: 93 % | DIASTOLIC BLOOD PRESSURE: 95 MMHG | WEIGHT: 230 LBS | HEART RATE: 80 BPM | SYSTOLIC BLOOD PRESSURE: 145 MMHG | HEIGHT: 67 IN | BODY MASS INDEX: 36.1 KG/M2

## 2025-05-13 DIAGNOSIS — C44.310 BASAL CELL CARCINOMA, FACE: ICD-10-CM

## 2025-05-13 PROCEDURE — 88332 PATH CONSLTJ SURG EA ADD BLK: CPT | Performed by: SURGERY

## 2025-05-13 PROCEDURE — 88305 TISSUE EXAM BY PATHOLOGIST: CPT | Performed by: SURGERY

## 2025-05-13 PROCEDURE — 88331 PATH CONSLTJ SURG 1 BLK 1SPC: CPT | Performed by: SURGERY

## 2025-05-13 RX ORDER — LIDOCAINE HYDROCHLORIDE AND EPINEPHRINE 10; 10 MG/ML; UG/ML
INJECTION, SOLUTION INFILTRATION; PERINEURAL AS NEEDED
Status: DISCONTINUED | OUTPATIENT
Start: 2025-05-13 | End: 2025-05-13 | Stop reason: HOSPADM

## 2025-05-13 RX ORDER — BALANCED SALT SOLUTION 6.4; .75; .48; .3; 3.9; 1.7 MG/ML; MG/ML; MG/ML; MG/ML; MG/ML; MG/ML
SOLUTION OPHTHALMIC AS NEEDED
Status: DISCONTINUED | OUTPATIENT
Start: 2025-05-13 | End: 2025-05-13 | Stop reason: HOSPADM

## 2025-05-13 RX ORDER — LIDOCAINE HYDROCHLORIDE 10 MG/ML
INJECTION, SOLUTION INFILTRATION; PERINEURAL AS NEEDED
Status: DISCONTINUED | OUTPATIENT
Start: 2025-05-13 | End: 2025-05-13 | Stop reason: HOSPADM

## 2025-05-13 NOTE — H&P
History and Physical     Chief Complaint: basal cell carcinoma left inferior medial forehead and nasal tip      History of Present Illness:   Bernadette Shahid is a 89 year old female referred by [facility] for evaluation of a recently diagnosed basal cell carcinoma of the left inferior medial forehead and nasal tip. These were diagnosed via shave biopsy on 1/21/2025. She went to a dermatologist for excision with a Mohs surgeon but as she requires a kinza lift, this was unable to be done at their office. The son reports the nasal lesion started growing in December 2024.      Pathology from 1/21/2025:   A. Left inferior medial forehead  Metatypical basal cell carcinoma  B. Nasal dorsum  Metatypical basal cell carcinoma     Past Medical History:      Hem/Onc  Basal cell carcinoma, face     Other  Incarcerated ventral hernia  Vascular dementia, unspecified dementia severity, unspecified whether behavioral, psychotic, or mood disturbance or anxiety (HCC)  Hypertension, unspecified type           Past Surgical History:  [Past Surgical History]    [Past Surgical History]  History reviewed. No pertinent surgical history.        Medications:    clonazePAM 0.25 MG Oral Tablet Dispersible  amLODIPine 10 MG Oral Tab  atorvastatin 10 MG Oral Tab  desmopressin 0.2 MG Oral Tab  apixaban 5 MG Oral Tab  Omega-3 Fatty Acids (FISH OIL) 1200 MG Oral Cap  melatonin 3 MG Oral Tab  pantoprazole 40 MG Oral Tab EC  sertraline 50 MG Oral Tab  acetaminophen 325 MG Oral Tab  diclofenac 1 % External Gel  nitroglycerin 0.4 MG Sublingual SL Tab  Nystatin Does not apply Powder  Turmeric 450 MG Oral Cap         Allergies:    [Allergies]    [Allergies]  No Known Allergies        Family History:   [Family History]    [Family History]  History reviewed. No pertinent family history.        Social History:      History   Alcohol use Not on file             History   Smoking Status    Never   Smokeless Tobacco    Never             History   Drug  Use Not on file         Review of Systems:  See HPI     Physical Exam:    Constitutional: The patient is an alert, oriented and well-developed.      Neurologic: Speech patterns and movements are normal.      Psychiatric: Affect is appropriate.     Eyes: Conjunctiva are clear, non-icteric. PERRL     ENT: no obvious abnormality, no ear drainage, mucous membranes moist and pink     Integument/Skin: crusted basal cell carcinoma nasal tip approximately 1 cm diameter, not fixed and mobile basal cell carcinoma 1.5 cm wide by 2.5 cm long left inferior medial forehead     Respiratory: Normal respiratory effort.      Cardiovascular: no cyanosis, no edema     Musculoskeletal: Extremities unremarkable, without edema, tenderness or deformities     Impression:   Bernadette Shahid  is a 89 year old with basal cell carcinoma of the left inferior medial forehead and nasal tip.      Discussion and Plan:  The patient was counseled on the different treatment options.      Patient is in a wheelchair and requires a kinza lift. She was unable to proceed with Mohs excision given her limited mobility.      We discussed the option for excision in the operating room with intraoperative frozen sections.      Given her age and medical comorbidities, we discussed proceeding with the procedure under local anesthesia.     She is scheduled for excision of basal cell carcinoma nasal tip and possible excision of left forehead basal cell carcinoma with intraoperative frozen sections, reconstruction with skin graft, possible integra, possible local tissue rearrangement.    We will start with excision of the nasal basal cell carcinoma. We discussed the possibility that if the excision becomes too extensive, we discussed that there may still be some residual basal cell carcinoma of the nose and the forehead may not be able to be addressed at the first surgery due to limitations with local anesthesia.     Her son is interested in only proceeding with the  nose today.     The different treatment options were discussed with the patient.  The procedures and the postoperative care was discussed in detail.  Potential risks complications benefits and alternatives were discussed.  Risks and complications including but not limited to infection, bleeding, scarring, damage to surrounding structures, such as nerves, blood vessels, muscles,  tendons, risk of hematoma, seroma, foreign body reaction, allergic reaction, wound dehiscences, delayed wound healing, graft failure, flap failure, need for further surgery.    Patient understands and wishes to proceed with the procedure, questions were answered.

## 2025-05-13 NOTE — BRIEF OP NOTE
Pre-Operative Diagnosis: Basal cell carcinoma, face [C44.310]     Post-Operative Diagnosis: Basal cell carcinoma, face [C44.310]      Procedure Performed:   Excision of basal cell carcinoma nasal tip    Surgeons and Role:     * Odilia Holloway MD - Primary    Assistant(s):  PA: Ela Fam PA     Surgical Findings: see dictation     Specimen: see pathology     Estimated Blood Loss: 1 cc    ABDI Painter  5/13/2025  2:35 PM

## 2025-05-13 NOTE — DISCHARGE INSTRUCTIONS
Plastic Surgery Home Care Instructions      We hope you were pleased with your care at [facility].  We wish you the best outcome and overall experience with your operation.  These instructions will help to minimize pain, optimize healing, and improve the likelihood of a successful result.    What To Expect  Oozing at the surgical site is expected. Hold pressure for 30 minutes for any bleeding. If the bleeding does not improve, you may have to present to urgent care or emergency room.   Discomfort in the surgical area is typical.     Bandages (Dressing)  Apply antibiotic ointment (neosporin, bacitracin, etc) daily followed by nonstick gauze and steri strips or paper tape to hold in place. You may have to change this more often if needed for any oozing.     Bathing/Showers  Don't get nose wet  No baths, swimming, or hot tubs until you receive medical permission    Over-The-Counter Medication  You can take Tylenol after surgery for pain relief as needed  Take as directed on the bottle    Home Medication  Resume your home medications as instructed  Ok to resume Eliquis, vitamin E, tumeric tomorrow    Diet  Resume your normal diet    Activity  No strenuous activity     Follow-up Appointment   Our office will call you to set up a time    Questions or Concerns  Hold pressure for 30 minutes for any bleeding. If the bleeding does not improve, you may have to present to urgent care or emergency room.     Bernadette   Thank you for coming to [facility] for your operation.  The nurses and the anesthesiologist try very hard to make sure you receive the best care possible.  Your trust in them is greatly appreciated.    Thanks so much,  Dr. Jewel Fam PA-C

## 2025-05-14 NOTE — OPERATIVE REPORT
[facility]    PATIENT'S NAME: MARK ALVARADO   ATTENDING PHYSICIAN: Odilia Holloway MD   OPERATING PHYSICIAN: Odilia Holloway MD   PATIENT ACCOUNT#:   [id number]    LOCATION:  [facility]  MEDICAL RECORD #:   [id number]       YOB: 1933  ADMISSION DATE:       05/13/2025      OPERATION DATE:  05/13/2025    OPERATIVE REPORT      PREOPERATIVE DIAGNOSIS:  Basal cell cancer, nasal tip; basal cell cancer, forehead.  POSTOPERATIVE DIAGNOSIS:  Basal cell cancer, nasal tip; basal cell cancer, forehead.  PROCEDURE:  Excision of basal cell cancer, nasal tip, 2.2 cm.    ASSISTANT:  Ela Fam PA-C     ANESTHESIA:  Local anesthesia.    COMPLICATIONS:  None.    BLOOD LOSS:  Minimal.    INDICATIONS:  This is a 89-year-old female with basal cell cancer on her nasal tip as well as her forehead.  She was seen in the office to discuss treatment options.  Due to her requirement of needing a John lift to transport her and the inability to have this done in the office with Mohs excision, she was sent to have this done in the operating room.  Due to her age and dementia, the family did not wish to proceed with any general anesthesia or sedation.  We discussed the options for local anesthesia and we discussed the limitations of such approach.  Due to the bleeding nature and exophytic growth of her nasal basal cell cancer, we discussed to focus on that first prior to any forehead excision of the basal cell cancer in that area.  We discussed the options for excision, Integra placement, skin grafting, and flap procedure depending on the defect.  For her specific case, we discussed the need for potential staging and limitation due to her age and her ability to tolerate procedures.  The patient and son understand and they wish to proceed.  Risks and complications discussed including, but not limited to, infection, bleeding, scarring, damage to surrounding structures, hematoma, seroma, nasal  deformity, bleeding, need for further surgery.  The patient and her son understand, and they wish to proceed.      OPERATIVE TECHNIQUE:  Patient was identified in the preoperative area.  The plan was reviewed and we discussed to not address the forehead basal cell cancer at this time as it is giving her minimal symptoms.  Patient was then brought back to the operating room and placed in supine position.  She was adequately padded, and sequential compression devices were applied.  Then, her face was prepped and draped in the usual sterile fashion.  Then, 1% lidocaine first without epinephrine was used to surrounding the incision site with a 30-gauge needle, and then 1% lidocaine with epinephrine was used as well.  Lidocaine without epinephrine, a total of 3 mL was used, and with epinephrine, a total of 1 mL was used.  The patient achieved adequate local anesthesia, but overall was uncomfortably lying down and also confused about the need for the procedure.  Then, a 15 blade was used to carefully incise around the basal cell cancer and then gently lift that up from her nasal cartilage, and then electrocautery was used as well for the excision of the basal cell cancer on her nose.  This was then marked at the 12 o'clock position with a marking stitch and sent for analysis and frozen section to Pathology.  On the frozen section analysis, all margins were negative.  The procedure was more difficult to perform due to the patient being uncomfortable based on her age and dementia.  This required a 22 modifier due to the extra work needed to assure that the patient was comfortable through the procedure.  Through the procedure, she also requested to see her son which was not possible; therefore, we called him on the phone to communicate and also discussed that at this point no immediate reconstruction can bed done as she would not tolerate any further steps.  We discussed the plan for topical wound care until final pathology  results return and discuss any further plan after that.  The son understands, and the patient was appreciative of the care.  Hemostasis was assured.  Bacitracin ointment and Telfa were applied and secured with a Steri-Strip.  The patient tolerated the procedure well and was brought to Recovery.  All sponge, instrument, and needle counts were correct at the end of the case.      Dictated By Odilia Holloway MD  d: 05/13/2025 17:42:49  t: 05/13/2025 18:44:51  [facility] [id number]  [facility] [place]/

## 2025-05-15 ENCOUNTER — HOSPITAL ENCOUNTER (EMERGENCY)
Facility: HOSPITAL | Age: OVER 89
Discharge: HOME | End: 2025-05-15
Attending: EMERGENCY MEDICINE
Payer: MEDICARE

## 2025-05-15 VITALS
SYSTOLIC BLOOD PRESSURE: 125 MMHG | OXYGEN SATURATION: 96 % | DIASTOLIC BLOOD PRESSURE: 75 MMHG | TEMPERATURE: 97 F | RESPIRATION RATE: 21 BRPM | HEART RATE: 63 BPM

## 2025-05-15 DIAGNOSIS — R58 BLEEDING: ICD-10-CM

## 2025-05-15 DIAGNOSIS — C44.310 BASAL CELL CARCINOMA (BCC) OF SKIN OF FACE, UNSPECIFIED PART OF FACE: Primary | ICD-10-CM

## 2025-05-15 LAB
BASOPHILS # BLD AUTO: 0.04 X10(3) UL (ref 0–0.2)
BASOPHILS NFR BLD AUTO: 0.3 %
EOSINOPHIL # BLD AUTO: 0.26 X10(3) UL (ref 0–0.7)
EOSINOPHIL NFR BLD AUTO: 2.1 %
ERYTHROCYTE [DISTWIDTH] IN BLOOD BY AUTOMATED COUNT: 15.3 %
HCT VFR BLD AUTO: 43.8 % (ref 35–48)
HGB BLD-MCNC: 13.7 G/DL (ref 12–16)
IMM GRANULOCYTES # BLD AUTO: 0.07 X10(3) UL (ref 0–1)
IMM GRANULOCYTES NFR BLD: 0.6 %
LYMPHOCYTES # BLD AUTO: 1.13 X10(3) UL (ref 1–4)
LYMPHOCYTES NFR BLD AUTO: 9.2 %
MCH RBC QN AUTO: 27.6 PG (ref 26–34)
MCHC RBC AUTO-ENTMCNC: 31.3 G/DL (ref 31–37)
MCV RBC AUTO: 88.1 FL (ref 80–100)
MONOCYTES # BLD AUTO: 0.81 X10(3) UL (ref 0.1–1)
MONOCYTES NFR BLD AUTO: 6.6 %
NEUTROPHILS # BLD AUTO: 9.95 X10 (3) UL (ref 1.5–7.7)
NEUTROPHILS # BLD AUTO: 9.95 X10(3) UL (ref 1.5–7.7)
NEUTROPHILS NFR BLD AUTO: 81.2 %
PLATELET # BLD AUTO: 264 10(3)UL (ref 150–450)
RBC # BLD AUTO: 4.97 X10(6)UL (ref 3.8–5.3)
WBC # BLD AUTO: 12.3 X10(3) UL (ref 4–11)

## 2025-05-15 PROCEDURE — 99283 EMERGENCY DEPT VISIT LOW MDM: CPT

## 2025-05-15 PROCEDURE — 36415 COLL VENOUS BLD VENIPUNCTURE: CPT

## 2025-05-15 PROCEDURE — 85025 COMPLETE CBC W/AUTO DIFF WBC: CPT | Performed by: EMERGENCY MEDICINE

## 2025-05-15 NOTE — ED PROVIDER NOTES
Patient Seen in: [facility] Emergency Department      History     Chief Complaint   Patient presents with    Nose Bleed     Stated Complaint: Pt had surgery excision of basal cell carcinoma on nose 5/13. Now pt has been b*    Subjective:   HPI  History of Present Illness            89-year-old female history of atrial fibrillation on Eliquis, dementia, hypertension status post excision of basal cell carcinoma nasal tip on 5/13/2025 presents emergency room for evaluation of bleeding from surgical site.  Per EMS patient started bleeding this morning, EMS states the patient did seem to be rubbing at the area.  Pressure was applied and patient was brought to the Emergency Department.  Patient has not had any Eliquis since surgery.      Objective:     Past Medical History:    A-fib (HCC)    Anxiety    Arrhythmia    Cancer (HCC)    BASAL CELL, BREAST    Congestive heart disease (HCC)    Esophageal reflux    Essential hypertension    Exposure to medical diagnostic radiation    Hearing impaired person, bilateral    High blood pressure    Hyperlipidemia    TIA (transient ischemic attack)    Vascular dementia (HCC)              History reviewed. No pertinent surgical history.             Social History     Socioeconomic History    Marital status:    Tobacco Use    Smoking status: Never    Smokeless tobacco: Never   Vaping Use    Vaping status: Never Used   Substance and Sexual Activity    Alcohol use: Never    Drug use: Never                                Physical Exam     ED Triage Vitals   BP 05/15/25 0945 141/75   Pulse 05/15/25 0945 64   Resp 05/15/25 0945 16   Temp 05/15/25 1022 97 °F (36.1 °C)   Temp src 05/15/25 1022 Temporal   SpO2 05/15/25 0945 92 %   O2 Device 05/15/25 0945 None (Room air)       Current Vitals:   Vital Signs  BP: 141/75  Pulse: 64  Resp: 16  Temp: 97 °F (36.1 °C)  Temp src: Temporal  MAP (mmHg): 93    Oxygen Therapy  SpO2: 92 %  O2 Device: None (Room air)          Physical  Exam  GENERAL: Patient is awake, alert, in no acute distress.  HEENT:  no scleral icterus.  Wound to the tip of the nose, small amount of oozing.  No pulsatile bleeding.  No erythema or induration.  No drainage.  Mucous membranes are moist, oropharynx is clear.  Scalp is atraumatic.  HEART: Irregular irregular  LUNGS: Clear to auscultation bilaterally.  No Rales, no rhonchi, no wheezing, no stridor.  ABDOMEN: Soft, nondistended,non tender    ED Course     Labs Reviewed   CBC WITH DIFFERENTIAL WITH PLATELET - Abnormal; Notable for the following components:       Result Value    WBC 12.3 (*)     Neutrophil Absolute Prelim 9.95 (*)     Neutrophil Absolute 9.95 (*)     All other components within normal limits          Results                                MDM        Differential diagnosis before testing includes but not limited to wound dehiscence, wound infection, anemia, thrombocytopenia, which is a medical condition that poses a threat to life/function    Past Medical History/comorbidities-as noted in HPI      Course of Events during Emergency Room Visit include upon arrival to emergency room, hemostatic dressing was applied, IV established and blood work obtained.  CBC was unremarkable.  Patient was observed throughout ER evaluation, on multiple reevaluations there is been no recurrence of bleeding.  Discussed results with son at bedside.  Patient be discharged back to [facility], does have appointment scheduled on Monday with her plastic surgeon.  Return if any further problems.  Continue treatment as previously outlined by her surgeon.    Shared decision making was utilized         Discharge  I have discussed with the patient the results of test, differential diagnosis, treatment plan, warning signs and symptoms which should prompt immediate return.  They expressed understanding of these instructions and agrees to the following plan provided.  They were given written discharge instructions and  agrees to return for any concerns and voiced understanding and all questions were answered.    Note to patient: The 21st Century Cures Act makes medical notes like these available to patients in the interest of transparency. However, this is a medical document intended as peer to peer communication. It is written in medical language and may contain abbreviations or verbiage that are unfamiliar. It may appear blunt or direct. Medical documents are intended to carry relevant information, facts as evident, and the clinical opinion of the practitioner.                 Medical Decision Making      Disposition and Plan     Clinical Impression:  1. Basal cell carcinoma (BCC) of skin of face, unspecified part of face    2. Bleeding         Disposition:  Discharge  5/15/2025 10:24 am    Follow-up:  Odilia Holloway MD  [address] [place], [place] [id number]  [phone]    Call in 1 day(s)            Medications Prescribed:  Current Discharge Medication List                Supplementary Documentation:

## 2025-05-15 NOTE — ED INITIAL ASSESSMENT (HPI)
Pt presents to ED via EMS from [facility] for a top of the nose bleed. Pt had basal cell carcinoma removed  on the tip of the nose 2 days ago. Pt began bleeding today, bleeding was controlled when ems arrived. Pt ended up scratching at it and bleeding became uncontrolled. Pt is a/o x1, alzheimers. Vital signs stable. Pt was taken off blood thinners prior to surgery.

## 2025-05-15 NOTE — ED QUICK NOTES
Pt son unsure if patient restarted apixaban. This RN called patients RN at [facility]. Per RN Viridiana will start apixaban today.

## 2025-05-16 DIAGNOSIS — C44.310 BASAL CELL CARCINOMA, FACE: Primary | ICD-10-CM

## 2025-05-19 ENCOUNTER — TELEPHONE (OUTPATIENT)
Facility: CLINIC | Age: OVER 89
End: 2025-05-19

## 2025-05-19 ENCOUNTER — OFFICE VISIT (OUTPATIENT)
Dept: SURGERY | Facility: CLINIC | Age: OVER 89
End: 2025-05-19
Payer: COMMERCIAL

## 2025-05-19 DIAGNOSIS — S01.20XD OPEN WOUND OF NOSE, UNSPECIFIED OPEN WOUND TYPE, SUBSEQUENT ENCOUNTER: Primary | ICD-10-CM

## 2025-05-19 PROCEDURE — 99024 POSTOP FOLLOW-UP VISIT: CPT | Performed by: PHYSICIAN ASSISTANT

## 2025-05-19 PROCEDURE — 87077 CULTURE AEROBIC IDENTIFY: CPT | Performed by: PHYSICIAN ASSISTANT

## 2025-05-19 PROCEDURE — 87070 CULTURE OTHR SPECIMN AEROBIC: CPT | Performed by: PHYSICIAN ASSISTANT

## 2025-05-19 PROCEDURE — 87206 SMEAR FLUORESCENT/ACID STAI: CPT | Performed by: PHYSICIAN ASSISTANT

## 2025-05-19 PROCEDURE — 87075 CULTR BACTERIA EXCEPT BLOOD: CPT | Performed by: PHYSICIAN ASSISTANT

## 2025-05-19 PROCEDURE — 87186 SC STD MICRODIL/AGAR DIL: CPT | Performed by: PHYSICIAN ASSISTANT

## 2025-05-19 PROCEDURE — 87102 FUNGUS ISOLATION CULTURE: CPT | Performed by: PHYSICIAN ASSISTANT

## 2025-05-19 PROCEDURE — 87205 SMEAR GRAM STAIN: CPT | Performed by: PHYSICIAN ASSISTANT

## 2025-05-19 NOTE — PROGRESS NOTES
Bernadette Shahid is a 89 year old female who presents today for a follow-up after excision of basal cell cancer nasal tip on 5/13/2025.  Patient is accompanied by her son.  Patient is in no acute distress.  Patient was seen in the emergency department on 5/15/2025 for bleeding from surgical site.  A hemostatic dressing was applied and no recurrent bleeding was noted.  She is here today for wound check.    Pathology from 5/13/2025  Final Diagnosis:   Skin, nasal tip, excision:  - Basal cell carcinoma, nodular type.  - The margins of excision are negative for tumor.       Physical Exam     HEENT: Nasal tip wound with fibrinous exudate and some purulent drainage.  Central forehead with scab and surrounding honey colored crust. Mucopurulent drainage at the corners of the eye and lid margin.     Temp: 98.3    There were no vitals filed for this visit.      Assessment and Plan     Bernadette Shahid is doing well s/p excision of basal cell cancer nasal tip on 5/13/2025.    Cultures were taken of the nasal wound today. Her nasal wound was then cleansed with Vashe and debrided to patient tolerance. A dressing of bacitracin, telfa and steri-strips were placed on the nose. Bacitracin was applied to the forehead scab as well.     Instructions were written for her facility to perform dressing changes 3 times daily with mupirocin ointment, Telfa and Steri-Strips over the nasal wound and mupirocin ointment to the forehead.  They will need to prescribe an ophthalmic antibiotic ointment.  They also should wash and shower the patient including her nasal wound.  Her temperature is normal in the office.  Per son, patient is already on antibiotics for a UTI.  She should be monitored for any systemic signs of infection.  She will follow-up on 5/28/2025 with Dr. Holloway for wound recheck.    Questions were answered. Patient understands.     The 21st Century Cures Act makes medical notes like these available to patients in the interest of  transparency. Please be advised this is a medical document. Medical documents are intended to carry relevant information, facts as evident, and the clinical opinion of the practitioner. The medical note is intended as peer to peer communication and may appear blunt or direct. It is written in medical language and may contain abbreviations or verbiage that are unfamiliar.     ABDI Painter  5/19/2025  1:50 PM

## 2025-05-19 NOTE — PATIENT INSTRUCTIONS
Wound care instructions  Apply mupirocin ointment three times daily to nasal wound and forehead wound. Place telfa dressing and tape to cover.  Shower patient daily including nasal wound.   Primary care provider to prescribe antibiotic eye drops.   Monitor for any systemic signs of infection. Primary care provider to prescribe oral antibiotics if needed. Per son's report, patient is already on oral antibiotics for a UTI.

## 2025-05-19 NOTE — TELEPHONE ENCOUNTER
I called [facility] and spoke to Bernadette's nurse.  I informed them of the instructions to wash and shower the patient including her nose.  They should change her nasal bandage 3 times daily with mupirocin, Telfa, tape or Steri-Strips.  They should apply mupirocin ointment 3 times daily to her forehead.  They will need the primary care provider to prescribe an ophthalmic antibiotic and monitor for any systemic signs of infection.  The nurse reported dressing changes are difficult for the patient but they will try.  She also reported that the patient is frequently touching the surgical site despite instruction not to and despite a dressing in place.  She will follow-up on 5/28/2025 for recheck with Dr. Holloway.

## 2025-05-22 ENCOUNTER — TELEPHONE (OUTPATIENT)
Facility: CLINIC | Age: OVER 89
End: 2025-05-22

## 2025-05-22 NOTE — TELEPHONE ENCOUNTER
I called and spoke to Bernadette's nurse at [facility]. She informed me that the primary care provider started her on an ophthalmic antibiotic ointment and oral doxycycline. They are applying mupirocin ointment TID. We discussed they should continue washing the area and use Vashe.     We will fax the culture results to the nursing home for the primary care provider to review. They should continue to monitor for systemic signs of infection and treat as indicated.     The nurse reported the wounds were stable. She is getting some Ativan per the primary care provider, but still is touching the wounds.    I spoke to the son to discuss this as well. He reported a change in appearance of the forehead basal cell cancer area. I discussed that this area had scabbing and some crusting when I saw her Monday. He reported this was the current appearence. They will continue to manage the infection. I explained that the forehead basal cell cancer was not able to be addressed at her surgery due to patient tolerance of the procedure. They will follow up with Dr. Holloway Wednesday to discuss this in more detail including treatment options.

## 2025-05-23 ENCOUNTER — TELEPHONE (OUTPATIENT)
Facility: CLINIC | Age: OVER 89
End: 2025-05-23

## 2025-05-23 NOTE — TELEPHONE ENCOUNTER
I called [facility] and spoke to the nurse to inform them of the updated culture results. We sent a fax with the results. She is on mupirocin. I recommend they discuss recommendations for any oral antibiotics with the primary care provider.

## 2025-05-27 NOTE — PROGRESS NOTES
Bernadette Shahid is a 89 year old female who presents today for a follow-up after excision of basal cell cancer nasal tip on 5/13/2025.      Patient is accompanied by her son. Patient was seen in the emergency department on 5/15/2025 for bleeding from surgical site.  A hemostatic dressing was applied and no recurrent bleeding was noted.      Pathology from 5/13/2025  Final Diagnosis:   Skin, nasal tip, excision:  - Basal cell carcinoma, nodular type.  - The margins of excision are negative for tumor.      She was evaluated in the office on 5/19/2025 and wound cultures were taken.     AEROBIC CULTURE RESULT 4+ growth Streptococcus pyogenes (Grp A beta strep) Abnormal    Susceptiblity not performed. Group A Streptococcus remains universally susceptible to Penicillin.   2+ growth Staphylococcus aureus, MRSA Abnormal    3+ growth Gram positive antonella        AEROBIC SMEAR No WBCs seen   3+ Gram Positive Cocci        She was started on ophthalmic antibiotic ointment, oral doxycycline, and topical mupirocin ointment. She is also being treated for pneumonia. She is here today for wound re-check.     Physical Exam     HEENT: nasal wound with scabbing and fibrinous exudate at wound base; no erythema or active drainage; central forehead with scabbing over basal cell cancer, no erythema or wound drainage    There were no vitals filed for this visit.      Assessment and Plan     Bernadette Shahid is doing well s/p excision of basal cell cancer nasal tip on 5/13/2025.      I recommend topical wound care to the nose and forehead with continued washing, Vashe and mupirocin ointment.     Given her age, multiple medical comorbidities, and her inability to tolerate a procedure under local anesthesia, we discussed healing of her nasal wound by secondary intention and not surgically treating her forehead basal cell cancer. This was discussed with her son in length and he understands and agrees with the plan. We also briefly discussed the  role/ advantages of pre-hospice care at her facility, he is considering this.     She will continue local care to her wounds and continue medical care with her primary care provider and her facility nurses.  Since the visit is upsetting the pt due to the transportation and pain in her back/ buttom, we discussed the role for virtual follow ups/ pictures to be sent of her healing progress rather than in person visits at this time.     Questions were answered. Patient understands.     The 21st Century Cures Act makes medical notes like these available to patients in the interest of transparency. Please be advised this is a medical document. Medical documents are intended to carry relevant information, facts as evident, and the clinical opinion of the practitioner. The medical note is intended as peer to peer communication and may appear blunt or direct. It is written in medical language and may contain abbreviations or verbiage that are unfamiliar.

## 2025-05-28 ENCOUNTER — OFFICE VISIT (OUTPATIENT)
Dept: SURGERY | Facility: CLINIC | Age: OVER 89
End: 2025-05-28
Payer: COMMERCIAL

## 2025-05-28 DIAGNOSIS — C44.310 BASAL CELL CARCINOMA, FACE: Primary | ICD-10-CM

## 2025-05-28 PROCEDURE — 99024 POSTOP FOLLOW-UP VISIT: CPT | Performed by: SURGERY

## 2025-05-28 PROCEDURE — 1159F MED LIST DOCD IN RCRD: CPT | Performed by: SURGERY

## 2025-05-28 PROCEDURE — 1160F RVW MEDS BY RX/DR IN RCRD: CPT | Performed by: SURGERY

## 2025-07-01 ENCOUNTER — TELEPHONE (OUTPATIENT)
Dept: SURGERY | Facility: CLINIC | Age: OVER 89
End: 2025-07-01

## 2025-07-01 NOTE — TELEPHONE ENCOUNTER
Premier Gonzalez reached out and would like to have [facility] office notes faxed over to their office.     Fax # [phone]

## (undated) DEVICE — SHEET, DRAPE, SPLIT, STERILE: Brand: MEDLINE

## (undated) DEVICE — SLIM BODY SKIN STAPLER: Brand: APPOSE ULC

## (undated) DEVICE — #10 STERILE BLADE: Brand: POLYMER COATED BLADES

## (undated) DEVICE — GLOVE SUR 7 SENSICARE PI PIP GRN PWD F

## (undated) DEVICE — LAPAROTOMY SPONGE - RF AND X-RAY DETECTABLE PRE-WASHED: Brand: SITUATE

## (undated) DEVICE — SYRINGE MED 10ML LL CTRL W/ FNGR GRP CLR BRL

## (undated) DEVICE — SOLUTION PREP 30ML 5% POVIDONE IOD EYE BDINE

## (undated) DEVICE — 3M™ STERI-STRIP™ REINFORCED ADHESIVE SKIN CLOSURES, R1547, 1/2 IN X 4 IN (12 MM X 100 MM), 6 STRIPS/ENVELOPE: Brand: 3M™ STERI-STRIP™

## (undated) DEVICE — PACK DRAPE HEAD/NECK STER

## (undated) DEVICE — INSULATED NEEDLE ELECTRODE: Brand: EDGE

## (undated) DEVICE — INSULATED BLADE ELECTRODE: Brand: EDGE

## (undated) DEVICE — GLOVE SUR 6.5 SENSICARE PI PIP CRM PWD F

## (undated) DEVICE — CABLE BPLR L12FT FLYING LD DISP

## (undated) DEVICE — SOLUTION IRRIG 1000ML 0.9% NACL USP BTL

## (undated) DEVICE — BANDAGE,GAUZE,BULKEE II,4.5"X4.1YD,STRL: Brand: MEDLINE

## (undated) DEVICE — SKN PREP SPNG STKS PVP PNT STR: Brand: MEDLINE INDUSTRIES, INC.

## (undated) DEVICE — MARKER SKIN PREP-RESISTANT ST: Brand: MEDLINE INDUSTRIES, INC.

## (undated) NOTE — LETTER
Patient Name: Bernadette Shahid DOB-Age / Sex: 1933-A: 89 y  female   Medical Records: [id number] CSN: [id number]    DNAR NOTIFICATION    Your patient listed above has a procedure scheduled at [facility] and has indicated that he/she currently has a DNAR (Do Not Attempt Resuscitattion) with their Advanced Directives.    Please note that you will be asked to address this matter with your patient pre-operatively.  Thank you.      Procedure Date 2025  Procedure Excision of basal cell carcinoma nasal tip and possible excision of left forehead basal cell carcinoma with intraoperative frozen sections, reconstruction with skin graft, possible integra, possible local tissue rearrangement, N/A  SKIN GRAFT, N/A    Surgeon(s):  Odilia Holloway MD

## (undated) NOTE — LETTER
[facility] Pre-Admission Testing Department  Phone: [phone]  Right Fax: [phone]    Patient Name: Bernadette Shahid  : 1933 - A: 89 y    Sex: female  Medical Record: [id number]  CSN: [id number]    Date of Surgery: 2025  Surgeon: Odilia Holloway MD  Surgery/Procedure: Excision of basal cell carcinoma nasal tip and possible excision of left forehead basal cell carcinoma with intraoperative frozen sections, reconstruction with skin graft, possible integra, possible local tissue rearrangement  Requested Documents:  Please fax the following information to Pre-Admission Testing [phone]:  Face sheet with current diagnosis  Medication list  Copy of any advanced directives (POA/Legal guardian, Living Will, DNR, etc.)  Is the patient able to provide their own medical history information? If not, who should we contact (POA, Legal guardian, etc.)?  : _____________________  Phone Number: _____________________

## (undated) NOTE — IP AVS SNAPSHOT
[address]            (For Outpatient Use Only) Initial Admit Date: 10/5/2022   Inpt/Obs Admit Date: Inpt: 10/5/22 / Obs: N/A   Discharge Date:    Prema Carrillo:  [de-identified]   MRN: [de-identified]   CSN: [id number]   CEID: [id number]        ENCOUNTER  Patient Class: Inpatient Admitting Provider: Daryl Meadows MD Unit: [facility] Service: Surgical Attending Provider: Shaun Blackwood MD   Bed: 4670-C   Visit Type:   Referring Physician: No ref. provider found Billing Flag:    Admit Diagnosis: Incarcerated ventral hernia [K43.6]      PATIENT  Legal Name:   Alex Powers   Legal Sex: Female  Gender ID:              [id number] [facility] Name:    PCP:  Cecilia Huynh MD Home: [facility] [id number]   Address:  [address] [place] [address] : 1933 (89 yrs) Mobile: [phone]         City/State/Zip: [place], [address] [place], [place] [address]e [place] Marital:  Language: [address] [place] [address] Drive: [employer] SSN4: [id number] Tenriism: [address] Not Dayan Minks*     Race: White Ethnicity: Non  Or [address] [place] [address]   Name Relationship Legal Guardian? Home Phone Work Phone Mobile Phone   1. Darvin Lee  2.  Beba Hanson    [phone]  [phone]     GUARANTOR  Guarantor: Miguel Blackmon ARMANDO : 1933 Home Phone: [phone]   Address: [address] [place] [address]  Sex: Female Work Phone:    City/State/Zip: [address] [place]   Rel. to Patient: Self Guarantor ID: [id number]   [address]   Employer:  Status: RETIRED     COVERAGE  PRIMARY INSURANCE   Payor: [employer] [facility] Plan: BCBS MEDICARE ADV PPO   Group Number: [id number] Insurance Type: [address] Name: Khushi Bustillo : 1933   Subscriber ID: [id number] Pt Rel to Subscriber: Self   SECONDARY INSURANCE   Payor:  Plan:    Group Number:  Insurance Type:    Subscriber Name:  Subscriber :    Subscriber ID:  Pt Rel to Subscriber:    TERTIARY INSURANCE   Payor:  Plan:    Group Number:  Insurance Type: Subscriber Name:  Danielamerly Allyssa :    Subscriber ID:  Pt Rel to Subscriber:    Hospital Account Financial Class: Medicare Advantage    2022